# Patient Record
Sex: MALE | URBAN - METROPOLITAN AREA
[De-identification: names, ages, dates, MRNs, and addresses within clinical notes are randomized per-mention and may not be internally consistent; named-entity substitution may affect disease eponyms.]

---

## 2017-06-28 ENCOUNTER — EMERGENCY (EMERGENCY)
Facility: HOSPITAL | Age: 27
LOS: 1 days | Discharge: HOME | End: 2017-06-28
Attending: EMERGENCY MEDICINE | Admitting: EMERGENCY MEDICINE
Payer: SELF-PAY

## 2017-06-28 VITALS
WEIGHT: 164.91 LBS | RESPIRATION RATE: 18 BRPM | OXYGEN SATURATION: 99 % | DIASTOLIC BLOOD PRESSURE: 89 MMHG | TEMPERATURE: 98 F | SYSTOLIC BLOOD PRESSURE: 127 MMHG | HEART RATE: 82 BPM

## 2017-06-28 DIAGNOSIS — F19.10 OTHER PSYCHOACTIVE SUBSTANCE ABUSE, UNCOMPLICATED: ICD-10-CM

## 2017-06-28 PROCEDURE — 99283 EMERGENCY DEPT VISIT LOW MDM: CPT | Mod: 25

## 2017-06-28 PROCEDURE — 99053 MED SERV 10PM-8AM 24 HR FAC: CPT

## 2017-06-28 NOTE — ED PROVIDER NOTE - PROGRESS NOTE DETAILS
patient walking in the ED, requesting to be dc. has aob, admits to drug use. patient is a poor historian. has no head trauma. ambulating with a normal gait. no s/s of withdrawls.

## 2017-06-28 NOTE — ED ADULT NURSE NOTE - CHIEF COMPLAINT QUOTE
picked up in front of [facility]- admits to drinking denies drug use- ambulates with steady gait- pt arrives with no belongings

## 2017-06-28 NOTE — ED PROVIDER NOTE - UNABLE TO OBTAIN
Urgent need for Intervention Patient arrives intoxicated, unable to provide a history or cooperate with physical exam.

## 2017-06-28 NOTE — ED ADULT TRIAGE NOTE - CHIEF COMPLAINT QUOTE
picked up in front of [facility]- admits to drinking denies drug use- ambulates with steady gait picked up in front of [facility]- admits to drinking denies drug use- ambulates with steady gait- pt arrives with no belongings